# Patient Record
Sex: FEMALE | Race: BLACK OR AFRICAN AMERICAN | Employment: UNEMPLOYED | URBAN - METROPOLITAN AREA
[De-identification: names, ages, dates, MRNs, and addresses within clinical notes are randomized per-mention and may not be internally consistent; named-entity substitution may affect disease eponyms.]

---
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
## 2022-06-13 ENCOUNTER — HOSPITAL ENCOUNTER (EMERGENCY)
Age: 22
Discharge: LEFT BEFORE DISCHARGE | End: 2022-06-13

## 2025-05-09 NOTE — ED AVS SNAPSHOT
Jaylen Ordoñez   MRN: [id number]    Department:  THE [facility] Emergency Department in [place]   Date of Visit:  3/3/2020           Disclosure     Insurance plans vary and the physician(s) referred by the ER may not be covered by your plan.  Please contact tell this physician (or your personal doctor if your instructions are to return to your personal doctor) about any new or lasting problems. The primary care or specialist physician will see patients referred from the [facility] Emergency Department.  Beltran Torres [Time Spent: ___ minutes] : I have spent [unfilled] minutes of time on the encounter which excludes teaching and separately reported services.

## (undated) NOTE — LETTER
Date & Time: 3/3/2020, 6:36 PM  Patient: Gideon Alcantar  Encounter Provider(s):    Enrique Kelly MD       To Whom It May Concern:    Gideon Alcantar was seen and treated in our department on 3/3/2020. She should not return to school until March 5.

## (undated) NOTE — LETTER
Date & Time: 3/3/2020, 6:00 PM  Patient: Kitty Cornejo  Encounter Provider(s):    Josie Ortega MD       To Whom It May Concern:    Kitty Cornejo was seen and treated in our department on 3/3/2020. She should not return to work until March 5.